# Patient Record
Sex: FEMALE
[De-identification: names, ages, dates, MRNs, and addresses within clinical notes are randomized per-mention and may not be internally consistent; named-entity substitution may affect disease eponyms.]

---

## 2017-03-22 ENCOUNTER — HOSPITAL ENCOUNTER (OUTPATIENT)
Dept: HOSPITAL 5 - SPVWC | Age: 47
Discharge: HOME | End: 2017-03-22
Attending: OBSTETRICS & GYNECOLOGY
Payer: COMMERCIAL

## 2017-03-22 DIAGNOSIS — Z12.31: Primary | ICD-10-CM

## 2017-03-22 PROCEDURE — 77067 SCR MAMMO BI INCL CAD: CPT

## 2017-03-22 PROCEDURE — G0202 SCR MAMMO BI INCL CAD: HCPCS

## 2017-03-22 NOTE — MAMMOGRAPHY REPORT
BILATERAL DIGITAL SCREENING MAMMOGRAM with CAD: 03/22/17 09:14:00



CLINICAL: Routine screening.



COMPARISON:03/04/16



FINDINGS: The breasts are heterogeneously dense, which may obscure 

small masses. No mass, architectural distortion or suspicious 

calcifications.



IMPRESSION: No mammographic evidence of malignancy.



BI-RADS CATEGORY: 1 - - Negative



RECOMMENDATION: Routine mammographic screening in one year.





COMMENT:

Patient follow-up letters are generated by our [employer] application.

## 2018-03-28 ENCOUNTER — HOSPITAL ENCOUNTER (OUTPATIENT)
Dept: HOSPITAL 5 - SPVWC | Age: 48
Discharge: HOME | End: 2018-03-28
Attending: OBSTETRICS & GYNECOLOGY
Payer: COMMERCIAL

## 2018-03-28 DIAGNOSIS — Z12.31: Primary | ICD-10-CM

## 2018-03-28 PROCEDURE — 77067 SCR MAMMO BI INCL CAD: CPT

## 2018-03-28 NOTE — MAMMOGRAPHY REPORT
BILATERAL DIGITAL SCREENING MAMMOGRAM with CAD: 03/28/18 09:32:00



CLINICAL: Routine screening.



COMPARISON:03/22/17



FINDINGS: The breasts are heterogeneously dense, which may obscure 

small masses. No mass, architectural distortion or suspicious 

calcifications.



IMPRESSION: No mammographic evidence of malignancy.



BI-RADS CATEGORY: 1 - - Negative



RECOMMENDATION: Routine mammographic screening in one year.





COMMENT:

Patient follow-up letters are generated by our [employer] application.

## 2019-06-26 ENCOUNTER — HOSPITAL ENCOUNTER (OUTPATIENT)
Dept: HOSPITAL 5 - SPVWC | Age: 49
Discharge: HOME | End: 2019-06-26
Attending: FAMILY MEDICINE
Payer: COMMERCIAL

## 2019-06-26 DIAGNOSIS — Z12.31: Primary | ICD-10-CM

## 2019-06-26 PROCEDURE — 77067 SCR MAMMO BI INCL CAD: CPT

## 2019-06-26 NOTE — MAMMOGRAPHY REPORT
BILATERAL DIGITAL SCREENING MAMMOGRAM with CAD: 06/26/19 08:38:00



CLINICAL: Routine screening.



COMPARISON:03/28/18 and mammograms going back to 08/25/11



FINDINGS: The breasts are heterogeneously dense, which may obscure 

small masses. No mass, architectural distortion or suspicious 

calcifications.



IMPRESSION: No mammographic evidence of malignancy.



BI-RADS CATEGORY: 1 - - Negative



RECOMMENDATION: Routine mammographic screening in one year.





COMMENT:

Patient follow-up letters are generated by our [employer] application.

## 2020-09-09 ENCOUNTER — HOSPITAL ENCOUNTER (OUTPATIENT)
Dept: HOSPITAL 5 - SPVWC | Age: 50
Discharge: HOME | End: 2020-09-09
Attending: FAMILY MEDICINE
Payer: COMMERCIAL

## 2020-09-09 DIAGNOSIS — Z12.31: Primary | ICD-10-CM

## 2020-09-09 PROCEDURE — 77067 SCR MAMMO BI INCL CAD: CPT

## 2020-09-09 NOTE — MAMMOGRAPHY REPORT
DIGITAL SCREENING MAMMOGRAM WITH CAD, 9/9/2020



INDICATION: Routine screening mammography. 



TECHNIQUE:  Digital bilateral  2D mammography was obtained in the craniocaudal and mediolateral obliq
ue projections. This examination was interpreted with the benefit of Computer-Aided Detection analysi
s.



COMPARISON: 6/26/2019, 3/28/2018



FINDINGS: 



Breast Density: The breasts are heterogeneously dense, which may obscure small masses.



There is no evidence of dominant mass, suspicious calcifications or architectural distortion in eithe
r breast.



IMPRESSION:



Follow up recommendation: Routine yearly



BI-RADS Category 1:  Negative.



A "normal" or negative report should not discourage follow up or biopsy of a clinically significant f
inding.



A written summary of these findings will be mailed to the patient. The patient will be entered into a
 mammography reporting system which will generate a reminder letter for the patient's next appointmen
t at the appropriate interval.



The [employer] recommends yearly mammograms starting at age 40 and continuing as l
leda as a woman is in good health.  Breast MRI is recommended for women with an approximate 20-25% or 
greater lifetime risk of breast cancer, including women with a strong family history of breast or ova
sally cancer or who have been treated for Hodgkin's disease.



Signer Name: Sourav Crowe MD 

Signed: 9/9/2020 9:08 AM

Workstation Name: [id number]

## 2021-10-05 NOTE — MAMMOGRAPHY REPORT
DIGITAL SCREENING MAMMOGRAM WITH CAD, 10/5/2021



CLINICAL INFORMATION / INDICATION: Routine screening mammography.



TECHNIQUE:  Digital bilateral 2D mammography was obtained in the craniocaudal and mediolateral obliqu
e projections. This examination was interpreted with the benefit of Computer-Aided Detection analysis
.



COMPARISON: 6/26/2019



FINDINGS: 



Breast Density: The breasts are heterogeneously dense, which may obscure small masses.



No dominant mass, suspicious calcifications, or architectural distortion in either breast. 



No interval change.

 

IMPRESSION: No mammographic evidence of malignancy.



Follow up recommendation: Routine yearly



BI-RADS Category 1:  Negative.





-------------------------------------------------------------------------------------------

A "normal" or negative report should not discourage follow up or biopsy of a clinically significant f
inding.



A written summary of these findings will be mailed to the patient. The patient will be entered into a
 mammography reporting system which will generate a reminder letter for the patient's next appointmen
t at the appropriate interval.



The [employer] recommends yearly mammograms starting at age 40 and continuing as l
leda as a woman is in good health.  Breast MRI is recommended for women with an approximate 20-25% or 
greater lifetime risk of breast cancer, including women with a strong family history of breast or ova
sally cancer or who have been treated for Hodgkin's disease.



Signer Name: Makayla Blackburn MD 

Signed: 10/5/2021 11:18 AM

Workstation Name: [id number]